# Patient Record
Sex: MALE | Race: WHITE | NOT HISPANIC OR LATINO | Employment: FULL TIME | URBAN - NONMETROPOLITAN AREA
[De-identification: names, ages, dates, MRNs, and addresses within clinical notes are randomized per-mention and may not be internally consistent; named-entity substitution may affect disease eponyms.]

---

## 2017-01-27 ENCOUNTER — HISTORY (OUTPATIENT)
Dept: FAMILY MEDICINE | Facility: OTHER | Age: 62
End: 2017-01-27

## 2017-01-27 ENCOUNTER — OFFICE VISIT - GICH (OUTPATIENT)
Dept: FAMILY MEDICINE | Facility: OTHER | Age: 62
End: 2017-01-27

## 2017-01-27 DIAGNOSIS — L03.012 CELLULITIS OF FINGER OF LEFT HAND: ICD-10-CM

## 2017-04-10 ENCOUNTER — HISTORY (OUTPATIENT)
Dept: FAMILY MEDICINE | Facility: OTHER | Age: 62
End: 2017-04-10

## 2017-04-10 ENCOUNTER — OFFICE VISIT - GICH (OUTPATIENT)
Dept: FAMILY MEDICINE | Facility: OTHER | Age: 62
End: 2017-04-10

## 2017-04-10 DIAGNOSIS — Z00.00 ENCOUNTER FOR GENERAL ADULT MEDICAL EXAMINATION WITHOUT ABNORMAL FINDINGS: ICD-10-CM

## 2017-04-10 DIAGNOSIS — I10 ESSENTIAL (PRIMARY) HYPERTENSION: ICD-10-CM

## 2017-04-10 DIAGNOSIS — E78.5 HYPERLIPIDEMIA: ICD-10-CM

## 2017-04-10 LAB
A/G RATIO - HISTORICAL: 1.2 (ref 1–2)
ALBUMIN SERPL-MCNC: 4.3 G/DL (ref 3.5–5.7)
ALP SERPL-CCNC: 50 IU/L (ref 34–104)
ALT (SGPT) - HISTORICAL: 22 IU/L (ref 7–52)
ANION GAP - HISTORICAL: 8 (ref 5–18)
AST SERPL-CCNC: 22 IU/L (ref 13–39)
BILIRUB SERPL-MCNC: 0.3 MG/DL (ref 0.3–1)
BUN SERPL-MCNC: 16 MG/DL (ref 7–25)
BUN/CREAT RATIO - HISTORICAL: 19
CALCIUM SERPL-MCNC: 9.3 MG/DL (ref 8.6–10.3)
CHLORIDE SERPLBLD-SCNC: 103 MMOL/L (ref 98–107)
CHOL/HDL RATIO - HISTORICAL: 3.67
CHOLESTEROL TOTAL: 158 MG/DL
CO2 SERPL-SCNC: 26 MMOL/L (ref 21–31)
CREAT SERPL-MCNC: 0.84 MG/DL (ref 0.7–1.3)
GFR IF NOT AFRICAN AMERICAN - HISTORICAL: >60 ML/MIN/1.73M2
GLOBULIN - HISTORICAL: 3.5 G/DL (ref 2–3.7)
GLUCOSE SERPL-MCNC: 102 MG/DL (ref 70–105)
HDLC SERPL-MCNC: 43 MG/DL (ref 23–92)
LDLC SERPL CALC-MCNC: 98 MG/DL
NON-HDL CHOLESTEROL - HISTORICAL: 115 MG/DL
PATIENT STATUS - HISTORICAL: NORMAL
POTASSIUM SERPL-SCNC: 4 MMOL/L (ref 3.5–5.1)
PROT SERPL-MCNC: 7.8 G/DL (ref 6.4–8.9)
PSA TOTAL (DIAGNOSTIC) - HISTORICAL: 2.45 NG/ML
SODIUM SERPL-SCNC: 137 MMOL/L (ref 133–143)
TRIGL SERPL-MCNC: 87 MG/DL

## 2018-01-03 NOTE — PROGRESS NOTES
Patient Information     Patient Name MRN Sex Danny Limon [id number] Male 1955      Progress Notes by Yovana Martin NP at 2017  4:45 PM     Author:  Yovana Martin NP Service:  (none) Author Type:  PHYS- Nurse Practitioner     Filed:  2017  6:19 PM Encounter Date:  2017 Status:  Signed     :  Yovana Martin NP (PHYS- Nurse Practitioner)        Procedure Orders:    1. INCISION AND DRAINAGE [[id number]] ordered by Yovana Martin NP at 17 1736            Post-procedure Diagnoses:    1. Paronychia of finger, left [L03.012]               Nursing Notes:   Radha Guajardo  2017  5:27 PM  Signed  Patient presents with swollen left index finger. Patient stated he split a nail a couple of weeks ago. Radha Guajardo LPN .............2017  4:55 PM    SUBJECTIVE:    Danny Lua is a 61 y.o. male who presents for finger infection    Abscess    This is a new problem. The current episode started less than one week ago (Swelling and pain for 3-4 days). The onset was sudden. The problem has been gradually worsening. The abscess is present on the left fingers. The problem is mild. The abscess is characterized by redness, painfulness and swelling. The patient was exposed to OTC medications. The abscess first occurred at home. Pertinent negatives include no fever, not sleeping more, no vomiting and no cough. His past medical history does not include skin abscesses in family. There were sick contacts at home. He has received no recent medical care.       Current Outpatient Prescriptions on File Prior to Visit       Medication  Sig Dispense Refill     lisinopril (PRINIVIL; ZESTRIL) 10 mg tablet Take 1 tablet by mouth once daily. 90 tablet 3     simvastatin (ZOCOR) 20 mg tablet Take 1 tablet by mouth at bedtime. 90 tablet 3     No current facility-administered medications on file prior to visit.        REVIEW OF SYSTEMS:  Review of Systems   Constitutional:  "Negative for fever.   Respiratory: Negative for cough.    Gastrointestinal: Negative for vomiting.       OBJECTIVE:  /78  Pulse 92  Temp 97.9  F (36.6  C) (Tympanic)  Ht 1.81 m (5' 11.26\")  Wt 90.6 kg (199 lb 12.8 oz)  BMI 27.66 kg/m2    EXAM:   Physical Exam   Constitutional: He is well-developed, well-nourished, and in no distress.   HENT:   Head: Normocephalic and atraumatic.   Eyes: Conjunctivae are normal.   Cardiovascular: Normal rate.    Pulmonary/Chest: Effort normal.   Musculoskeletal:   LT hand, Index finger Medial side has a paronychia: red, swollen, painful yellow fluid collection   Skin: Skin is warm and dry. No rash noted.   Nursing note and vitals reviewed.      INCISION AND DRAINAGE  Date/Time: 1/27/2017 5:25 PM  Performed by: ANTONY العراقي  Authorized by: ANTONY العراقي     Consent:     Consent obtained:  Verbal and written    Consent given by:  Patient    Risks discussed:  Bleeding, incomplete drainage and pain    Alternatives discussed:  No treatment, delayed treatment, alternative treatment, observation and referral  Location:     Type:  Abscess    Location:  Upper extremity    Upper extremity location:  Finger    Finger location:  L index finger  Pre-procedure details:     Skin preparation:  Hibiclens and Betadine  Anesthesia (see MAR for exact dosages):     Anesthesia method:  None  Procedure type:     Complexity:  Simple  Procedure details:     Incision types:  Stab incision    Incision depth:  Dermal    Scalpel blade:  11    Wound management:  Probed and deloculated    Drainage:  Purulent    Drainage amount:  Moderate    Wound treatment:  Wound left open    Packing materials:  None  Post-procedure details:     Patient tolerance of procedure:  Tolerated well, no immediate complications      ASSESSMENT/PLAN:    ICD-10-CM    1. Paronychia of finger, left L03.012 cephalexin (KEFLEX) 500 mg capsule      MS DRAIN SKIN ABSCESS SIMPLE        Plan:  Finger cleaned and abscess lanced. " Immediate return of pus. Soaks discussed, abx ointment discussed and oral abx discussed. I explained my diagnostic considerations and recommendations to the patient, who voiced understanding and agreement with the treatment plan. All questions were answered. We discussed potential side effects of any prescribed or recommended therapies, as well as expectations for response to treatments. He was advised to contact our office if there is no improvement or worsening of conditions or symptoms.  If s/s worsen or persist, patient will either come back or follow up with PCP.       ANTONY العراقي NP ....................  1/27/2017   6:19 PM

## 2018-01-03 NOTE — NURSING NOTE
Patient Information     Patient Name MRN Danny De Leon [id number] Male 1955      Nursing Note by Radha Guajardo at 2017  4:45 PM     Author:  Radha Guajardo Service:  (none) Author Type:  NURS- Student Practical Nurse     Filed:  2017  5:27 PM Encounter Date:  2017 Status:  Signed     :  Radha Guajardo (NURS- Student Practical Nurse)            Patient presents with swollen left index finger. Patient stated he split a nail a couple of weeks ago. Radha Guajardo LPN .............2017  4:55 PM

## 2018-01-03 NOTE — PATIENT INSTRUCTIONS
Patient Information     Patient Name MRN Danny De Leon [id number] Male 1955      Patient Instructions by Yovana Martin NP at 2017  4:45 PM     Author:  Yovana Martin NP Service:  (none) Author Type:  PHYS- Nurse Practitioner     Filed:  2017  5:29 PM Encounter Date:  2017 Status:  Signed     :  Yovana Martin NP (PHYS- Nurse Practitioner)            Cellulitis - Take the antibiotic as prescribed. Antibiotic has been sent to pharmacy. Please take full course of antibiotic even if symptoms have completely resolved. This helps prevent against antibiotic resistance.     Watch for any signs of increasing infection (redness, pus, increased pain, increased swelling or fever). Call if any of these signs occur. Monitor for fevers or chills.    You may draw line around area of redness to monitor area. Please return to clinic if redness is continuing to spread after 2-3 days.    Call or return to clinic as needed if your symptoms worsen or fail to improve as anticipated.    Soak the wound in hot water with antibacterial soap for 10-20  minutes at a time 4-5  times per day until healing is established.  Use antibiotic ointment on the wound 2 times daily.  You can keep it covered with a bandage if you think it will get dirty.

## 2018-01-04 NOTE — PROGRESS NOTES
Patient Information     Patient Name MRN Sex Danny Limon [id number] Male 1955      Progress Notes by Benny Overton MD at 4/10/2017  8:00 AM     Author:  Benny Overton MD Service:  (none) Author Type:  Physician     Filed:  4/10/2017  8:14 AM Encounter Date:  4/10/2017 Status:  Signed     :  Benny Overton MD (Physician)            SUBJECTIVE:  Danny Lua is a 61 y.o. male here for annual exam.  Patient has a history of hypertension and hyperlipidemia. These aren't stable with lisinopril and simvastatin. He tolerates his medications well. His dad  of prostate cancer but he has had no changes in urinary symptoms.  Last colonoscopy was in  and he scheduled repeat in . He has no other concerns today.      Patient Active Problem List      Diagnosis Date Noted     Diverticulosis of sigmoid colon 2015     H/O adenomatous polyp of colon 2015     Family history of prostate cancer 2014     Hyperlipidemia 2013     Tobacco abuse 2013     HYPERTENSION        No past medical history on file.    Past Surgical History:      Procedure  Laterality Date     COLONOSCOPY DIAGNOSTIC  4/20/15    F/U        COLONOSCOPY SCREENING  2011    few tics, large polyp in rectum (see path report).  Next due .         Current Outpatient Prescriptions       Medication  Sig Dispense Refill     lisinopril (PRINIVIL; ZESTRIL) 10 mg tablet Take 1 tablet by mouth once daily. 90 tablet 3     simvastatin (ZOCOR) 20 mg tablet Take 1 tablet by mouth at bedtime. 90 tablet 3     No current facility-administered medications for this visit.      Medications have been reviewed by me and are current to the best of my knowledge and ability.      Allergies:  No Known Allergies    Family History       Problem   Relation Age of Onset     Cancer  Mother      liver       Cancer-prostate  Father      metastatic       Heart Disease  Father      family history on father side    "      Social History       Substance Use Topics         Smoking status:   Current Every Day Smoker     Packs/day:  0.50     Types:  Cigarettes     Smokeless tobacco:   Never Used      Comment: cutting down      Alcohol use   No       ROS:    As above otherwise ROS is unremarkable.      OBJECTIVE:  /81  Pulse 97  Ht 1.791 m (5' 10.5\")  Wt 88.5 kg (195 lb 3.2 oz)  BMI 27.61 kg/m2    EXAM:  General Appearance: Pleasant, alert, appropriate appearance for age. No acute distress  Head: Normal. Normocephalic, atraumatic.  Eyes: PERRL, EOMI  Ears: Normal TM's bilaterally. Normal auditory canals and external ears.   OroPharynx: Dental hygiene adequate. Normal buccal mucosa. Normal pharynx.  Neck: Supple, no masses or nodes, no lymphadenopathy.  No thyromegaly.  Lungs: Normal chest wall and respirations. Clear to auscultation, no wheezes or crackles.  Cardiovascular: Regular rate and rhythm. S1, S2, no murmurs.  Gastrointestinal: Soft, nontender, no abnormal masses or organomegaly. BS normal.  : Prostate exam shows no nodules, tenderness. No hemorrhoids.  Musculoskeletal: No edema.  Skin: no concerning or new rashes.  Neurologic Exam: CN 2-12 grossly intact.  Normal gait.  Symmetric DTRs, No focal motor or sensory deficits. No tremor.  Psychiatric Exam: Alert and oriented, appropriate affect.    ASSESSEMENT AND PLAN:    Danny was seen today for physical.    Diagnoses and all orders for this visit:    Physical exam  -     PSA, TOTAL; Future  -     LIPID PANEL; Future  -     COMPLETE METABOLIC PANEL; Future    HYPERTENSION  -     lisinopril (PRINIVIL; ZESTRIL) 10 mg tablet; Take 1 tablet by mouth once daily.    Hyperlipidemia, unspecified hyperlipidemia type  -     simvastatin (ZOCOR) 20 mg tablet; Take 1 tablet by mouth at bedtime.    Colonoscopy in 2020. He is up-to-date on immunizations except for shingles vaccine which he'll pursue on his own. We'll get fasting labs today. His medications were refilled for " another year. Was again discussed importance of quitting smoking, he is not interested at this time but has cut down quite a bit over the last year.      Bismark Overton MD

## 2018-01-27 VITALS
HEIGHT: 71 IN | TEMPERATURE: 97.9 F | WEIGHT: 199.8 LBS | BODY MASS INDEX: 27.97 KG/M2 | HEART RATE: 92 BPM | DIASTOLIC BLOOD PRESSURE: 78 MMHG | SYSTOLIC BLOOD PRESSURE: 140 MMHG

## 2018-01-27 VITALS
HEIGHT: 71 IN | BODY MASS INDEX: 27.33 KG/M2 | HEART RATE: 97 BPM | DIASTOLIC BLOOD PRESSURE: 81 MMHG | SYSTOLIC BLOOD PRESSURE: 129 MMHG | WEIGHT: 195.2 LBS

## 2018-02-05 ENCOUNTER — DOCUMENTATION ONLY (OUTPATIENT)
Dept: FAMILY MEDICINE | Facility: OTHER | Age: 63
End: 2018-02-05

## 2018-02-05 PROBLEM — I10 HYPERTENSION: Status: ACTIVE | Noted: 2018-02-05

## 2018-02-05 RX ORDER — SIMVASTATIN 20 MG
20 TABLET ORAL AT BEDTIME
COMMUNITY
Start: 2017-04-10 | End: 2018-04-17

## 2018-02-05 RX ORDER — LISINOPRIL 10 MG/1
10 TABLET ORAL DAILY
COMMUNITY
Start: 2017-04-10 | End: 2018-04-17

## 2018-04-17 ENCOUNTER — OFFICE VISIT (OUTPATIENT)
Dept: FAMILY MEDICINE | Facility: OTHER | Age: 63
End: 2018-04-17
Attending: FAMILY MEDICINE
Payer: COMMERCIAL

## 2018-04-17 VITALS
HEART RATE: 80 BPM | DIASTOLIC BLOOD PRESSURE: 92 MMHG | SYSTOLIC BLOOD PRESSURE: 140 MMHG | BODY MASS INDEX: 29.18 KG/M2 | HEIGHT: 70 IN | WEIGHT: 203.8 LBS

## 2018-04-17 DIAGNOSIS — I10 ESSENTIAL HYPERTENSION: ICD-10-CM

## 2018-04-17 DIAGNOSIS — E78.00 PURE HYPERCHOLESTEROLEMIA: ICD-10-CM

## 2018-04-17 DIAGNOSIS — Z80.42 FAMILY HISTORY OF PROSTATE CANCER: ICD-10-CM

## 2018-04-17 DIAGNOSIS — Z00.00 ROUTINE HISTORY AND PHYSICAL EXAMINATION OF ADULT: Primary | ICD-10-CM

## 2018-04-17 DIAGNOSIS — Z72.0 TOBACCO ABUSE: ICD-10-CM

## 2018-04-17 LAB
ALBUMIN SERPL-MCNC: 4.5 G/DL (ref 3.5–5.7)
ALP SERPL-CCNC: 51 U/L (ref 34–104)
ALT SERPL W P-5'-P-CCNC: 24 U/L (ref 7–52)
ANION GAP SERPL CALCULATED.3IONS-SCNC: 8 MMOL/L (ref 3–14)
AST SERPL W P-5'-P-CCNC: 21 U/L (ref 13–39)
BILIRUB SERPL-MCNC: 0.4 MG/DL (ref 0.3–1)
BUN SERPL-MCNC: 16 MG/DL (ref 7–25)
CALCIUM SERPL-MCNC: 9.6 MG/DL (ref 8.6–10.3)
CHLORIDE SERPL-SCNC: 105 MMOL/L (ref 98–107)
CHOLEST SERPL-MCNC: 184 MG/DL
CO2 SERPL-SCNC: 26 MMOL/L (ref 21–31)
CREAT SERPL-MCNC: 0.88 MG/DL (ref 0.7–1.3)
GFR SERPL CREATININE-BSD FRML MDRD: 88 ML/MIN/1.7M2
GLUCOSE SERPL-MCNC: 102 MG/DL (ref 70–105)
HDLC SERPL-MCNC: 43 MG/DL (ref 23–92)
LDLC SERPL CALC-MCNC: 114 MG/DL
NONHDLC SERPL-MCNC: 141 MG/DL
POTASSIUM SERPL-SCNC: 4.2 MMOL/L (ref 3.5–5.1)
PROT SERPL-MCNC: 7.8 G/DL (ref 6.4–8.9)
PSA SERPL-MCNC: 2.98 NG/ML
SODIUM SERPL-SCNC: 139 MMOL/L (ref 134–144)
TRIGL SERPL-MCNC: 134 MG/DL

## 2018-04-17 PROCEDURE — 84153 ASSAY OF PSA TOTAL: CPT | Performed by: FAMILY MEDICINE

## 2018-04-17 PROCEDURE — 36415 COLL VENOUS BLD VENIPUNCTURE: CPT | Performed by: FAMILY MEDICINE

## 2018-04-17 PROCEDURE — 80061 LIPID PANEL: CPT | Performed by: FAMILY MEDICINE

## 2018-04-17 PROCEDURE — 99396 PREV VISIT EST AGE 40-64: CPT | Performed by: FAMILY MEDICINE

## 2018-04-17 PROCEDURE — 80053 COMPREHEN METABOLIC PANEL: CPT | Performed by: FAMILY MEDICINE

## 2018-04-17 RX ORDER — LISINOPRIL 10 MG/1
10 TABLET ORAL DAILY
Qty: 90 TABLET | Refills: 3 | Status: SHIPPED | OUTPATIENT
Start: 2018-04-17 | End: 2019-04-29

## 2018-04-17 RX ORDER — SIMVASTATIN 20 MG
20 TABLET ORAL AT BEDTIME
Qty: 90 TABLET | Refills: 3 | Status: SHIPPED | OUTPATIENT
Start: 2018-04-17 | End: 2019-04-29

## 2018-04-17 NOTE — NURSING NOTE
Patient presents today for his annual physical.  Elo Nielson LPN .............4/17/2018  8:46 AM

## 2018-04-17 NOTE — MR AVS SNAPSHOT
"              After Visit Summary   2018    Danny Lua    MRN: [id number]           Patient Information     Date Of Birth          1955        Visit Information        Provider Department      2018 8:45 AM Benny Overton MD [facility]        Today's Diagnoses     Routine history and physical examination of adult    -  1    Pure hypercholesterolemia        Essential hypertension        Family history of prostate cancer        Tobacco abuse           Follow-ups after your visit        Who to contact     If you have questions or need follow up information about today's clinic visit or your schedule please contact [facility] directly at [phone].  Normal or non-critical lab and imaging results will be communicated to you by [employer], letter or phone within 4 business days after the clinic has received the results. If you do not hear from us within 7 days, please contact the clinic through [employer] or phone. If you have a critical or abnormal lab result, we will notify you by phone as soon as possible.  Submit refill requests through [employer] or call your pharmacy and they will forward the refill request to us. Please allow 3 business days for your refill to be completed.          Additional Information About Your Visit        MyChart Information     [employer] lets you send messages to your doctor, view your test results, renew your prescriptions, schedule appointments and more. To sign up, go to [email or web address] . Click on \"Log in\" on the left side of the screen, which will take you to the Welcome page. Then click on \"Sign up Now\" on the right side of the page.     You will be asked to enter the access code listed below, as well as some personal information. Please follow the directions to create your username and password.     Your access code is: [email or web address]  Expires: 2018  9:20 AM     Your access code will  in 90 days. If you need help " "or a new code, please call your [place] clinic or [phone].        Care EveryWhere ID     This is your Care EveryWhere ID. This could be used by other organizations to access your [place] medical records  [id number]        Your Vitals Were     Pulse Height BMI (Body Mass Index)             80 5' 10\" (1.778 m) 29.24 kg/m2          Blood Pressure from Last 3 Encounters:   04/17/18 (!) 140/92   04/10/17 129/81   01/27/17 140/78    Weight from Last 3 Encounters:   04/17/18 203 lb 12.8 oz (92.4 kg)   04/10/17 195 lb 3.2 oz (88.5 kg)   01/27/17 199 lb 12.8 oz (90.6 kg)              We Performed the Following     Comprehensive metabolic panel     Lipid Profile     PSA GH          Where to get your medicines      These medications were sent to [facility] [address] [place] [id number]     Phone:  [phone]     lisinopril 10 MG tablet    simvastatin 20 MG tablet          Primary Care Provider Office Phone # Fax #    Benny Overton [phone]       [address] [facility] [address]        Equal Access to Services     YOKASTA DUNAWAY AH: Hadii dejon ku hadasho Soomaali, waaxda luqadaha, qaybta kaalmada adeegyada, waxay idiin haycarsonn merlene momin. So [facility] [phone].    ATENCIÓN: Si habla español, tiene a gooden disposición servicios gratZia Health Clinicos de asistencia lingüística. Llshruthi al [phone].    We comply with applicable federal civil rights laws and [place] laws. We do not discriminate on the basis of race, color, national origin, age, disability, sex, sexual orientation, or gender identity.            Thank you!     Thank you for choosing [facility] AND [facility]  for your care. Our goal is always to provide you with excellent care. Hearing back from our patients is one way we can continue to improve our services. Please take a few minutes to complete the written survey that you may receive in the mail " after your visit with us. Thank you!             Your Updated Medication List - Protect others around you: Learn how to safely use, store and throw away your medicines at www.disposemymeds.org.          This list is accurate as of 4/17/18  9:20 AM.  Always use your most recent med list.                   Brand Name Dispense Instructions for use Diagnosis    lisinopril 10 MG tablet    PRINIVIL/ZESTRIL    90 tablet    Take 1 tablet (10 mg) by mouth daily    Essential hypertension       simvastatin 20 MG tablet    ZOCOR    90 tablet    Take 1 tablet (20 mg) by mouth At Bedtime    Pure hypercholesterolemia

## 2018-04-17 NOTE — PROGRESS NOTES
SUBJECTIVE:  Danny Lua is a 62 year old male here for follow-up.  He has a history of hypertension and hyperlipidemia.  He is due for fasting labs and medication refills.  He has no new concerns today.  He continues to smoke half a pack a day and is not ready to quit smoking.      Patient Active Problem List    Diagnosis Date Noted     Hypertension 02/05/2018     Priority: Medium     Diverticulosis of sigmoid colon 04/20/2015     Priority: Medium     H/O adenomatous polyp of colon 04/16/2015     Priority: Medium     Family history of prostate cancer 02/18/2014     Priority: Medium     Hyperlipidemia 11/18/2013     Priority: Medium     Tobacco abuse 11/18/2013     Priority: Medium       History reviewed. No pertinent past medical history.    Past Surgical History:   Procedure Laterality Date     COLONOSCOPY      01/20/2011,few tics, large polyp in rectum (see path report).  Next due 2014.     COLONOSCOPY      4/20/15,F/U 2020       Current Outpatient Prescriptions   Medication Sig Dispense Refill     lisinopril (PRINIVIL/ZESTRIL) 10 MG tablet Take 1 tablet (10 mg) by mouth daily 90 tablet 3     simvastatin (ZOCOR) 20 MG tablet Take 1 tablet (20 mg) by mouth At Bedtime 90 tablet 3     [DISCONTINUED] lisinopril (PRINIVIL/ZESTRIL) 10 MG tablet Take 10 mg by mouth daily       [DISCONTINUED] simvastatin (ZOCOR) 20 MG tablet Take 20 mg by mouth At Bedtime         Allergies:  No Known Allergies    Family History   Problem Relation Age of Onset     CANCER Mother      Cancer,liver     Prostate Cancer Father      Cancer-prostate,metastatic     HEART DISEASE Father      Heart Disease,family history on father side       Social History   Substance Use Topics     Smoking status: Current Every Day Smoker     Packs/day: 0.50     Types: Cigarettes     Smokeless tobacco: Never Used      Comment: Quit smoking: cutting down     Alcohol use No       ROS:    As above otherwise ROS is unremarkable.      OBJECTIVE:  BP (!) 140/92 (BP  "Location: Right arm, Patient Position: Sitting, Cuff Size: Adult Regular)  Pulse 80  Ht 5' 10\" (1.778 m)  Wt 203 lb 12.8 oz (92.4 kg)  BMI 29.24 kg/m2    EXAM:  General Appearance: Pleasant, alert, appropriate appearance for age. No acute distress  Head: Normal. Normocephalic, atraumatic.  Eyes: PERRL, EOMI  Ears: Normal TM's bilaterally. Normal auditory canals and external ears.   OroPharynx: Dental hygiene adequate. Normal buccal mucosa. Normal pharynx.  Neck: Supple, no masses or nodes, no lymphadenopathy.  No thyromegaly.  Lungs: Normal chest wall and respirations. Clear to auscultation, no wheezes or crackles.  Cardiovascular: Regular rate and rhythm. S1, S2, no murmurs.  Gastrointestinal: Soft, nontender, no abnormal masses or organomegaly. BS normal.  : Small external hemorrhage are noted.  Prostate exam shows no nodules, tenderness or asymmetry.  Musculoskeletal: No edema.  Skin: no concerning or new rashes.  Neurologic Exam: CN 2-12 grossly intact.  Normal gait.  Symmetric DTRs, No focal motor or sensory deficits. No tremor.  Psychiatric Exam: Alert and oriented, appropriate affect.    ASSESSEMENT AND PLAN:    1. Routine history and physical examination of adult    2. Pure hypercholesterolemia    3. Essential hypertension    4. Family history of prostate cancer    5. Tobacco abuse      Discussed shingles vaccine which she will pursue on his own.  He is otherwise up-to-date on his tetanus.  Colonoscopy in 2020.    Fasting labs are performed today.    Blood pressure is adequate.  Encouraged daily exercise and low-salt diet.    Discussed importance of quitting smoking, he is not interested at this time.  He did not tolerate Chantix well.  We briefly discussed his other options such as nicotine replacement options and Wellbutrin again he declined at this time.  A total of 5 minutes were spent discussing his options.    Bismark Overton MD  Family Medicine      This document was prepared using voice generated " software.  While every attempt was made for accuracy, grammatical errors may exist.

## 2018-04-17 NOTE — LETTER
April 17, 2018      Danny Lua    [address] [facility] [place] [address]        Dear ,    We are writing to inform you of your test results.    Your cholesterol panel, diabetes screening with fasting glucose, liver, kidney and prostate testing all came back normal.  This is all reassuring and should be rechecked again in 1 year.    Resulted Orders   Lipid Profile   Result Value Ref Range    Cholesterol 184 <200 mg/dL    Triglycerides 134 <150 mg/dL    HDL Cholesterol 43 23 - 92 mg/dL    LDL Cholesterol Calculated 114 (H) <100 mg/dL      Comment:      Above desirable:  100-129 mg/dl  Borderline High:  130-159 mg/dL  High:             160-189 mg/dL  Very high:       >189 mg/dl      Non HDL Cholesterol 141 (H) <130 mg/dL      Comment:      Above Desirable:  130-159 mg/dl  Borderline high:  160-189 mg/dl  High:             190-219 mg/dl  Very high:       >219 mg/dl     Comprehensive metabolic panel   Result Value Ref Range    Sodium 139 134 - 144 mmol/L    Potassium 4.2 3.5 - 5.1 mmol/L    Chloride 105 98 - 107 mmol/L    Carbon Dioxide 26 21 - 31 mmol/L    Anion Gap 8 3 - 14 mmol/L    Glucose 102 70 - 105 mg/dL    Urea Nitrogen 16 7 - 25 mg/dL    Creatinine 0.88 0.70 - 1.30 mg/dL    GFR Estimate 88 >60 mL/min/1.7m2    GFR Estimate If Black >90 >60 mL/min/1.7m2    Calcium 9.6 8.6 - 10.3 mg/dL    Bilirubin Total 0.4 0.3 - 1.0 mg/dL    Albumin 4.5 3.5 - 5.7 g/dL    Protein Total 7.8 6.4 - 8.9 g/dL    Alkaline Phosphatase 51 34 - 104 U/L    ALT 24 7 - 52 U/L    AST 21 13 - 39 U/L   PSA GH   Result Value Ref Range    Prostate Specific Antigen 2.978 <3.100 ng/mL       If you have any questions or concerns, please call the clinic at the number listed above.       Sincerely,        Benny Overton MD

## 2018-07-23 NOTE — PROGRESS NOTES
Patient Information     Patient Name  Danny Lua MRN  [id number] Sex  Male   1955      Letter by Benny Overton MD at      Author:  Benny Overton MD Service:  (none) Author Type:  (none)    Filed:   Encounter Date:  4/10/2017 Status:  (Other)           Danny Lua  [address]  [address] [facility] [address]          April 10, 2017    Dear Mr. Lua:    Your recent lab values can be seen below.     Your cholesterol panel, diabetes screening with fasting glucose, liver, kidney and prostate testing came back normal. This is reassuring and should be checked again in one year.    If you have any questions, do not hesitate to contact me.    Results for orders placed or performed in visit on 04/10/17      PSA, TOTAL      Result  Value Ref Range    PSA TOTAL (DIAGNOSTIC) 2.454 <=3.100 ng/mL   LIPID PANEL      Result  Value Ref Range    CHOLESTEROL,TOTAL 158 <200 mg/dL    TRIGLYCERIDES 87 <150 mg/dL    HDL CHOLESTEROL 43 23 - 92 mg/dL    NON-HDL CHOLESTEROL 115 <145 mg/dl    CHOL/HDL RATIO            3.67 <4.50                    LDL CHOLESTEROL 98 <100 mg/dL    PATIENT STATUS            FASTING                   COMPLETE METABOLIC PANEL      Result  Value Ref Range    SODIUM 137 133 - 143 mmol/L    POTASSIUM 4.0 3.5 - 5.1 mmol/L    CHLORIDE 103 98 - 107 mmol/L    CO2,TOTAL 26 21 - 31 mmol/L    ANION GAP 8 5 - 18                    GLUCOSE 102 70 - 105 mg/dL    CALCIUM 9.3 8.6 - 10.3 mg/dL    BUN 16 7 - 25 mg/dL    CREATININE 0.84 0.70 - 1.30 mg/dL    BUN/CREAT RATIO           19                    GFR if African American >60 >60 ml/min/1.73m2    GFR if not African American >60 >60 ml/min/1.73m2    ALBUMIN 4.3 3.5 - 5.7 g/dL    PROTEIN,TOTAL 7.8 6.4 - 8.9 g/dL    GLOBULIN                  3.5 2.0 - 3.7 g/dL    A/G RATIO 1.2 1.0 - 2.0                    BILIRUBIN,TOTAL 0.3 0.3 - 1.0 mg/dL    ALK PHOSPHATASE 50 34 - 104 IU/L    ALT (SGPT) 22 7 - 52 IU/L    AST (SGOT) 22 13 - 39 IU/L          Sincerely,        Bismark Overton MD  Family Medicine

## 2019-04-16 ENCOUNTER — OFFICE VISIT (OUTPATIENT)
Dept: FAMILY MEDICINE | Facility: OTHER | Age: 64
End: 2019-04-16
Attending: FAMILY MEDICINE
Payer: COMMERCIAL

## 2019-04-16 VITALS
TEMPERATURE: 98.4 F | SYSTOLIC BLOOD PRESSURE: 152 MMHG | WEIGHT: 205.2 LBS | HEART RATE: 100 BPM | HEIGHT: 70 IN | BODY MASS INDEX: 29.38 KG/M2 | DIASTOLIC BLOOD PRESSURE: 80 MMHG | RESPIRATION RATE: 14 BRPM

## 2019-04-16 DIAGNOSIS — M99.01 SOMATIC DYSFUNCTION OF SPINE, CERVICAL: Primary | ICD-10-CM

## 2019-04-16 PROCEDURE — 99213 OFFICE O/P EST LOW 20 MIN: CPT | Performed by: FAMILY MEDICINE

## 2019-04-16 ASSESSMENT — PATIENT HEALTH QUESTIONNAIRE - PHQ9: SUM OF ALL RESPONSES TO PHQ QUESTIONS 1-9: 0

## 2019-04-16 ASSESSMENT — PAIN SCALES - GENERAL: PAINLEVEL: MILD PAIN (3)

## 2019-04-16 ASSESSMENT — MIFFLIN-ST. JEOR: SCORE: 1737.03

## 2019-04-16 NOTE — PROGRESS NOTES
Nursing Notes:   Berta Busch LPN  4/16/2019  2:19 PM  Sign at exiting of workspace  Patient presents to clinic with neck pain. He states that the pain started about 2-3 weeks ago and no known injury. He says its like a sharp shooting pain when turning head to left, or looking down.  Berta Taylor ....................  4/16/2019   2:19 PM      SUBJECTIVE:   Danny Lua is a 63 year old male who presents to clinic today for the following health issues:     Here with pain in neck that started 2-3 weeks ago, awoke with the pain and no event or activity that initiated the pain. Usually sleeps on his sides. No headaches but hurts especially turning to left and when he looks down at phone or IPAD and then up he has a sharp discomfort in the neck only.  This does not radiate into his extremities and he is having no arm or extremity pain.  No history of trauma to the neck.  Last night when he got home from work it was bothering him more so he put some ice on it and it was slightly better when he awoke this morning.    HPI    Patient Active Problem List   Diagnosis     Diverticulosis of sigmoid colon     Family history of prostate cancer     H/O adenomatous polyp of colon     Hyperlipidemia     Hypertension     Tobacco abuse     Past Surgical History:   Procedure Laterality Date     COLONOSCOPY      01/20/2011,few tics, large polyp in rectum (see path report).  Next due 2014.     COLONOSCOPY      4/20/15,F/U 2020       Social History     Tobacco Use     Smoking status: Current Every Day Smoker     Packs/day: 0.50     Types: Cigarettes     Smokeless tobacco: Never Used     Tobacco comment: Quit smoking: cutting down   Substance Use Topics     Alcohol use: No     Family History   Problem Relation Age of Onset     Cancer Mother         Cancer,liver     Prostate Cancer Father         Cancer-prostate,metastatic     Heart Disease Father         Heart Disease,family history on father side         Current  "Outpatient Medications   Medication Sig Dispense Refill     lisinopril (PRINIVIL/ZESTRIL) 10 MG tablet Take 1 tablet (10 mg) by mouth daily 90 tablet 3     simvastatin (ZOCOR) 20 MG tablet Take 1 tablet (20 mg) by mouth At Bedtime 90 tablet 3     No Known Allergies    Review of Systems     OBJECTIVE:     /80   Pulse 100   Temp 98.4  F (36.9  C)   Resp 14   Ht 1.778 m (5' 10\")   Wt 93.1 kg (205 lb 3.2 oz)   BMI 29.44 kg/m    Body mass index is 29.44 kg/m .  Physical Exam   Constitutional: He appears well-developed. No distress.   Musculoskeletal:   No crepitus in neck but decreased range of motion with rotation to the left and slightly with forward flexion.  No significant tightness of the paraspinal muscles.   Neurological: He displays normal reflexes.   Psychiatric: He has a normal mood and affect. His behavior is normal.   Nursing note and vitals reviewed.      Diagnostic Test Results:  none     ASSESSMENT/PLAN:           ICD-10-CM    1. Somatic dysfunction of spine, cervical M99.01        Plan:  Discussed pathophysiology and management of this type of cervical finding.  He does not seem to have a lot of muscle spasming and likely has facet dysfunction.  Chiropractic intervention encouraged.  Continue OTCs as needed and if not improving when he comes back for his physical at the end of April and consider imaging.    Kate Nielson MD  [facility]    Portions of this dictation were created using the Dragon Nuance voice recognition system. Proofreading was completed but there may be errors in text.    "

## 2019-04-16 NOTE — NURSING NOTE
Patient presents to clinic with neck pain. He states that the pain started about 2-3 weeks ago and no known injury. He says its like a sharp shooting pain when turning head to left, or looking down.  Berta Taylor ....................  4/16/2019   2:19 PM

## 2019-04-16 NOTE — PATIENT INSTRUCTIONS
Patient Education     Neck Sprain or Strain  A sudden force that causes turning or bending of the neck can cause sprain or strain. An example would be the force from a car accident. This can stretch or tear muscles called a strain. It can also stretch or tear ligaments called a sprain. Either of these can cause neck pain. Sometimes neck pain occurs after a simple awkward movement. In either case, muscle spasm is commonly present and contributes to the pain.   Unless you had a forceful physical injury (for example, a car accident or fall), X-rays are often not ordered for the initial evaluation of neck pain. If pain continues and does not respond to medical treatment, X-rays and other tests may be done later.  Home care    You may feel more soreness and spasm the first few days after the injury. Rest until symptoms start to improve.    When lying down, use a comfortable pillow or a rolled towel that supports the head and keeps the spine in a neutral position. The position of the head should not be tilted forward or backward.    Apply an ice pack over the injured area for 15 to 20 minutes every 3 to 6 hours. Do this for the first 24 to 48 hours. You can make an ice pack by filling a plastic bag that seals at the top with ice cubes and then wrapping it with a thin towel. After 48 hours, apply heat (warm shower or warm bath) for 15 to 20 minutes several times a day, or alternate ice and heat.    You may use over-the-counter pain medicine to control pain, unless another pain medicine was prescribed. If you have chronic liver or kidney disease or ever had a stomach ulcer or gastrointestinal bleeding, talk with your healthcare provider before using these medicines.    If a soft cervical collar was prescribed, only ear it for periods of increased pain. It should not be worn for more than 3 hours a day, or for longer than 1 to 2 weeks.  Follow-up care  Follow up with your healthcare provider, or as directed. Physical  therapy may be needed.  Sometimes fractures don t show up on the first X-ray. Bruises and sprains can sometimes hurt as much as a fracture. These injuries can take time to heal completely. If your symptoms don t improve or they get worse, talk with your healthcare provider. You may need a repeat X-ray or other tests. If X-rays were taken, you will be told of any new findings that may affect your care.  Call 911  Call 911 if you have:    Neck swelling, difficulty or painful swallowing    Trouble breathing    Chest pain   When to seek medical advice  Call your healthcare provider right away if any of these occur:    Pain becomes worse or spreads into your arms or legs    Weakness or numbness in one or both arms or legs  Date Last Reviewed: 5/1/2018 2000-2018 [employer]. [address] [place] [address], [place] [address]. All rights reserved. This information is not intended as a substitute for professional medical care. Always follow your healthcare professional's instructions.

## 2019-04-29 ENCOUNTER — OFFICE VISIT (OUTPATIENT)
Dept: FAMILY MEDICINE | Facility: OTHER | Age: 64
End: 2019-04-29
Attending: FAMILY MEDICINE
Payer: COMMERCIAL

## 2019-04-29 VITALS
DIASTOLIC BLOOD PRESSURE: 88 MMHG | TEMPERATURE: 97.6 F | HEIGHT: 70 IN | WEIGHT: 202.8 LBS | SYSTOLIC BLOOD PRESSURE: 128 MMHG | BODY MASS INDEX: 29.03 KG/M2 | RESPIRATION RATE: 16 BRPM | HEART RATE: 96 BPM

## 2019-04-29 DIAGNOSIS — Z00.00 ROUTINE HISTORY AND PHYSICAL EXAMINATION OF ADULT: Primary | ICD-10-CM

## 2019-04-29 DIAGNOSIS — Z11.59 NEED FOR HEPATITIS C SCREENING TEST: ICD-10-CM

## 2019-04-29 DIAGNOSIS — E78.00 PURE HYPERCHOLESTEROLEMIA: ICD-10-CM

## 2019-04-29 DIAGNOSIS — Z12.5 SCREENING FOR PROSTATE CANCER: ICD-10-CM

## 2019-04-29 DIAGNOSIS — I10 ESSENTIAL HYPERTENSION: ICD-10-CM

## 2019-04-29 LAB
ALBUMIN SERPL-MCNC: 4.6 G/DL (ref 3.5–5.7)
ALP SERPL-CCNC: 58 U/L (ref 34–104)
ALT SERPL W P-5'-P-CCNC: 21 U/L (ref 7–52)
ANION GAP SERPL CALCULATED.3IONS-SCNC: 8 MMOL/L (ref 3–14)
AST SERPL W P-5'-P-CCNC: 19 U/L (ref 13–39)
BILIRUB SERPL-MCNC: 0.4 MG/DL (ref 0.3–1)
BUN SERPL-MCNC: 11 MG/DL (ref 7–25)
CALCIUM SERPL-MCNC: 9.6 MG/DL (ref 8.6–10.3)
CHLORIDE SERPL-SCNC: 103 MMOL/L (ref 98–107)
CHOLEST SERPL-MCNC: 188 MG/DL
CO2 SERPL-SCNC: 29 MMOL/L (ref 21–31)
CREAT SERPL-MCNC: 0.92 MG/DL (ref 0.7–1.3)
GFR SERPL CREATININE-BSD FRML MDRD: 83 ML/MIN/{1.73_M2}
GLUCOSE SERPL-MCNC: 101 MG/DL (ref 70–105)
HDLC SERPL-MCNC: 44 MG/DL (ref 23–92)
LDLC SERPL CALC-MCNC: 107 MG/DL
NONHDLC SERPL-MCNC: 144 MG/DL
POTASSIUM SERPL-SCNC: 4.1 MMOL/L (ref 3.5–5.1)
PROT SERPL-MCNC: 7.6 G/DL (ref 6.4–8.9)
PSA SERPL-ACNC: 3.03 NG/ML
SODIUM SERPL-SCNC: 140 MMOL/L (ref 134–144)
TRIGL SERPL-MCNC: 183 MG/DL

## 2019-04-29 PROCEDURE — 99396 PREV VISIT EST AGE 40-64: CPT | Performed by: FAMILY MEDICINE

## 2019-04-29 PROCEDURE — 36415 COLL VENOUS BLD VENIPUNCTURE: CPT | Mod: ZL | Performed by: FAMILY MEDICINE

## 2019-04-29 PROCEDURE — 86803 HEPATITIS C AB TEST: CPT | Mod: ZL | Performed by: FAMILY MEDICINE

## 2019-04-29 PROCEDURE — 80053 COMPREHEN METABOLIC PANEL: CPT | Mod: ZL | Performed by: FAMILY MEDICINE

## 2019-04-29 PROCEDURE — G0103 PSA SCREENING: HCPCS | Mod: ZL | Performed by: FAMILY MEDICINE

## 2019-04-29 PROCEDURE — 80061 LIPID PANEL: CPT | Mod: ZL | Performed by: FAMILY MEDICINE

## 2019-04-29 RX ORDER — LISINOPRIL 10 MG/1
10 TABLET ORAL DAILY
Qty: 90 TABLET | Refills: 3 | Status: SHIPPED | OUTPATIENT
Start: 2019-04-29 | End: 2020-03-23

## 2019-04-29 RX ORDER — SIMVASTATIN 20 MG
20 TABLET ORAL AT BEDTIME
Qty: 90 TABLET | Refills: 3 | Status: SHIPPED | OUTPATIENT
Start: 2019-04-29 | End: 2020-03-23

## 2019-04-29 ASSESSMENT — MIFFLIN-ST. JEOR: SCORE: 1726.14

## 2019-04-29 ASSESSMENT — PAIN SCALES - GENERAL: PAINLEVEL: NO PAIN (0)

## 2019-04-29 ASSESSMENT — PATIENT HEALTH QUESTIONNAIRE - PHQ9: SUM OF ALL RESPONSES TO PHQ QUESTIONS 1-9: 0

## 2019-04-29 NOTE — LETTER
April 30, 2019      Danny Lua  [address]    [facility] [address]        Dear ,    We are writing to inform you of your test results.    Your cholesterol panel, diabetes screening fasting glucose, liver, kidney and prostate testing all came back normal.  This should all be repeated again in 1 year.    Your hepatitis C testing came back negative which is reassuring.    Resulted Orders   Hepatitis C Screen Reflex to HCV RNA Quant and Genotype   Result Value Ref Range    Hepatitis C Antibody Nonreactive NR^Nonreactive      Comment:      Assay performance characteristics have not been established for newborns,   infants, and children     PSA Screen GH   Result Value Ref Range    PSA Screen 3.029 <3.100 ng/mL   Comprehensive metabolic panel   Result Value Ref Range    Sodium 140 134 - 144 mmol/L    Potassium 4.1 3.5 - 5.1 mmol/L    Chloride 103 98 - 107 mmol/L    Carbon Dioxide 29 21 - 31 mmol/L    Anion Gap 8 3 - 14 mmol/L    Glucose 101 70 - 105 mg/dL    Urea Nitrogen 11 7 - 25 mg/dL    Creatinine 0.92 0.70 - 1.30 mg/dL    GFR Estimate 83 >60 mL/min/[1.73_m2]    GFR Estimate If Black >90 >60 mL/min/[1.73_m2]    Calcium 9.6 8.6 - 10.3 mg/dL    Bilirubin Total 0.4 0.3 - 1.0 mg/dL    Albumin 4.6 3.5 - 5.7 g/dL    Protein Total 7.6 6.4 - 8.9 g/dL    Alkaline Phosphatase 58 34 - 104 U/L    ALT 21 7 - 52 U/L    AST 19 13 - 39 U/L   Lipid Profile   Result Value Ref Range    Cholesterol 188 <200 mg/dL    Triglycerides 183 (H) <150 mg/dL      Comment:      Borderline high:  150-199 mg/dl  High:             200-499 mg/dl  Very high:       >499 mg/dl      HDL Cholesterol 44 23 - 92 mg/dL    LDL Cholesterol Calculated 107 (H) <100 mg/dL      Comment:      Above desirable:  100-129 mg/dl  Borderline High:  130-159 mg/dL  High:             160-189 mg/dL  Very high:       >189 mg/dl      Non HDL Cholesterol 144 (H) <130 mg/dL      Comment:      Above Desirable:  130-159 mg/dl  Borderline high:  160-189  mg/dl  High:             190-219 mg/dl  Very high:       >219 mg/dl         If you have any questions or concerns, please call the clinic at the number listed above.       Sincerely,        Benny Overton MD

## 2019-04-29 NOTE — PROGRESS NOTES
SUBJECTIVE:  Danny Lua is a 63 year old male here for annual exam.  He has a history of hypertension hyperlipidemia.  These have both been well controlled and he is tolerating his medications well.    He continues to smoke but has nicotine patches that he is planning on starting in the next 1 to 2 weeks.      Patient Active Problem List    Diagnosis Date Noted     Hypertension 02/05/2018     Priority: Medium     Diverticulosis of sigmoid colon 04/20/2015     Priority: Medium     H/O adenomatous polyp of colon 04/16/2015     Priority: Medium     Family history of prostate cancer 02/18/2014     Priority: Medium     Hyperlipidemia 11/18/2013     Priority: Medium     Tobacco abuse 11/18/2013     Priority: Medium       History reviewed. No pertinent past medical history.    Past Surgical History:   Procedure Laterality Date     COLONOSCOPY      01/20/2011,few tics, large polyp in rectum (see path report).  Next due 2014.     COLONOSCOPY      4/20/15,F/U 2020       Current Outpatient Medications   Medication Sig Dispense Refill     lisinopril (PRINIVIL/ZESTRIL) 10 MG tablet Take 1 tablet (10 mg) by mouth daily 90 tablet 3     simvastatin (ZOCOR) 20 MG tablet Take 1 tablet (20 mg) by mouth At Bedtime 90 tablet 3       Allergies:  No Known Allergies    Family History   Problem Relation Age of Onset     Cancer Mother         Cancer,liver     Prostate Cancer Father         Cancer-prostate,metastatic     Heart Disease Father         Heart Disease,family history on father side       Social History     Tobacco Use     Smoking status: Current Every Day Smoker     Packs/day: 0.50     Types: Cigarettes     Smokeless tobacco: Never Used     Tobacco comment: Quit smoking: cutting down   Substance Use Topics     Alcohol use: No     Drug use: Never     Comment: Drug use: No       ROS:    As above otherwise ROS is unremarkable.      OBJECTIVE:  /88   Pulse 96   Temp 97.6  F (36.4  C) (Tympanic)   Resp 16   Ht 1.778 m  "(5' 10\")   Wt 92 kg (202 lb 12.8 oz)   BMI 29.10 kg/m      EXAM:  General Appearance: Pleasant, alert, appropriate appearance for age. No acute distress  Head: Normal. Normocephalic, atraumatic.  Eyes: PERRL, EOMI  Ears: Normal TM's bilaterally. Normal auditory canals and external ears.   OroPharynx: Dental hygiene adequate. Normal buccal mucosa. Normal pharynx.  Neck: Supple, no masses or nodes, no lymphadenopathy.  No thyromegaly.  Lungs: Normal chest wall and respirations. Clear to auscultation, no wheezes or crackles.  Cardiovascular: Regular rate and rhythm. S1, S2, no murmurs.  Gastrointestinal: Soft, nontender, no abnormal masses or organomegaly. BS normal.  : Symmetrically enlarged prostate without any nodules or tenderness.  No hemorrhoids.  Musculoskeletal: No edema.  Skin: no concerning or new rashes.  Neurologic Exam: CN 2-12 grossly intact.  Normal gait.  Symmetric DTRs, No focal motor or sensory deficits. No tremor.  Psychiatric Exam: Alert and oriented, appropriate affect.    ASSESSEMENT AND PLAN:    1. Routine history and physical examination of adult    2. Essential hypertension    3. Pure hypercholesterolemia    4. Screening for prostate cancer    5. Need for hepatitis C screening test      Will be due for colonoscopy in 2020.  Up-to-date on immunizations.    Will get fasting labs.    Medications refilled for another year.    Encouraged him to quit smoking and he will contact me if he needs a new prescription for nicotine patches down the road.    Bismark Overton MD  Family Medicine      This document was prepared using voice generated software.  While every attempt was made for accuracy, grammatical errors may exist.  "

## 2019-04-29 NOTE — NURSING NOTE
Patient presents today for annual physical.  Medication Reconciliation Complete    Mariola Ladd LPN  4/29/2019 10:33 AM

## 2019-04-30 LAB — HCV AB SERPL QL IA: NONREACTIVE

## 2020-03-23 ENCOUNTER — TELEPHONE (OUTPATIENT)
Dept: FAMILY MEDICINE | Facility: OTHER | Age: 65
End: 2020-03-23

## 2020-03-23 DIAGNOSIS — E78.00 PURE HYPERCHOLESTEROLEMIA: ICD-10-CM

## 2020-03-23 DIAGNOSIS — I10 ESSENTIAL HYPERTENSION: ICD-10-CM

## 2020-03-23 RX ORDER — LISINOPRIL 10 MG/1
10 TABLET ORAL DAILY
Qty: 90 TABLET | Refills: 3 | Status: SHIPPED | OUTPATIENT
Start: 2020-03-23 | End: 2021-04-05

## 2020-03-23 RX ORDER — SIMVASTATIN 20 MG
20 TABLET ORAL AT BEDTIME
Qty: 90 TABLET | Refills: 3 | Status: SHIPPED | OUTPATIENT
Start: 2020-03-23 | End: 2021-04-05

## 2020-03-23 NOTE — TELEPHONE ENCOUNTER
Patient just needs his medications filled. Refill pended. He will follow up this summer.    Mariola Ladd LPN on 3/23/2020 at 2:52 PM

## 2020-03-23 NOTE — TELEPHONE ENCOUNTER
Left message for patient to call back. He is scheduled with Dr. Overton Friday for medication check. If he just needs his lisinopril and simvastatin filled, we can send that in.     Mariola Ladd LPN on 3/23/2020 at 2:48 PM

## 2021-04-05 ENCOUNTER — OFFICE VISIT (OUTPATIENT)
Dept: FAMILY MEDICINE | Facility: OTHER | Age: 66
End: 2021-04-05
Attending: FAMILY MEDICINE
Payer: COMMERCIAL

## 2021-04-05 VITALS
OXYGEN SATURATION: 98 % | WEIGHT: 203.2 LBS | HEIGHT: 71 IN | DIASTOLIC BLOOD PRESSURE: 88 MMHG | HEART RATE: 108 BPM | RESPIRATION RATE: 16 BRPM | SYSTOLIC BLOOD PRESSURE: 134 MMHG | TEMPERATURE: 97.8 F | BODY MASS INDEX: 28.45 KG/M2

## 2021-04-05 DIAGNOSIS — Z12.11 SPECIAL SCREENING FOR MALIGNANT NEOPLASMS, COLON: ICD-10-CM

## 2021-04-05 DIAGNOSIS — Z00.00 ROUTINE HISTORY AND PHYSICAL EXAMINATION OF ADULT: Primary | ICD-10-CM

## 2021-04-05 DIAGNOSIS — Z72.0 TOBACCO ABUSE: ICD-10-CM

## 2021-04-05 DIAGNOSIS — Z12.5 SCREENING FOR PROSTATE CANCER: ICD-10-CM

## 2021-04-05 DIAGNOSIS — E78.00 PURE HYPERCHOLESTEROLEMIA: ICD-10-CM

## 2021-04-05 DIAGNOSIS — Z87.891 PERSONAL HISTORY OF TOBACCO USE: ICD-10-CM

## 2021-04-05 DIAGNOSIS — I10 ESSENTIAL HYPERTENSION: ICD-10-CM

## 2021-04-05 LAB
ALBUMIN SERPL-MCNC: 4.6 G/DL (ref 3.5–5.7)
ALP SERPL-CCNC: 51 U/L (ref 34–104)
ALT SERPL W P-5'-P-CCNC: 19 U/L (ref 7–52)
ANION GAP SERPL CALCULATED.3IONS-SCNC: 7 MMOL/L (ref 3–14)
AST SERPL W P-5'-P-CCNC: 18 U/L (ref 13–39)
BILIRUB SERPL-MCNC: 0.4 MG/DL (ref 0.3–1)
BUN SERPL-MCNC: 14 MG/DL (ref 7–25)
CALCIUM SERPL-MCNC: 9.6 MG/DL (ref 8.6–10.3)
CHLORIDE SERPL-SCNC: 101 MMOL/L (ref 98–107)
CHOLEST SERPL-MCNC: 166 MG/DL
CO2 SERPL-SCNC: 27 MMOL/L (ref 21–31)
CREAT SERPL-MCNC: 0.95 MG/DL (ref 0.7–1.3)
GFR SERPL CREATININE-BSD FRML MDRD: 80 ML/MIN/{1.73_M2}
GLUCOSE SERPL-MCNC: 113 MG/DL (ref 70–105)
HDLC SERPL-MCNC: 44 MG/DL (ref 23–92)
LDLC SERPL CALC-MCNC: 89 MG/DL
NONHDLC SERPL-MCNC: 122 MG/DL
POTASSIUM SERPL-SCNC: 4.2 MMOL/L (ref 3.5–5.1)
PROT SERPL-MCNC: 7.8 G/DL (ref 6.4–8.9)
PSA SERPL-ACNC: 2.96 NG/ML
SODIUM SERPL-SCNC: 135 MMOL/L (ref 134–144)
TRIGL SERPL-MCNC: 167 MG/DL

## 2021-04-05 PROCEDURE — G0296 VISIT TO DETERM LDCT ELIG: HCPCS | Performed by: FAMILY MEDICINE

## 2021-04-05 PROCEDURE — G0103 PSA SCREENING: HCPCS | Mod: ZL | Performed by: FAMILY MEDICINE

## 2021-04-05 PROCEDURE — 99396 PREV VISIT EST AGE 40-64: CPT | Performed by: FAMILY MEDICINE

## 2021-04-05 PROCEDURE — 80061 LIPID PANEL: CPT | Mod: ZL | Performed by: FAMILY MEDICINE

## 2021-04-05 PROCEDURE — 80053 COMPREHEN METABOLIC PANEL: CPT | Mod: ZL | Performed by: FAMILY MEDICINE

## 2021-04-05 PROCEDURE — 36415 COLL VENOUS BLD VENIPUNCTURE: CPT | Mod: ZL | Performed by: FAMILY MEDICINE

## 2021-04-05 RX ORDER — SIMVASTATIN 20 MG
20 TABLET ORAL AT BEDTIME
Qty: 90 TABLET | Refills: 3 | Status: SHIPPED | OUTPATIENT
Start: 2021-04-05 | End: 2022-05-19

## 2021-04-05 RX ORDER — LISINOPRIL 10 MG/1
10 TABLET ORAL DAILY
Qty: 90 TABLET | Refills: 3 | Status: SHIPPED | OUTPATIENT
Start: 2021-04-05 | End: 2022-05-19

## 2021-04-05 ASSESSMENT — PAIN SCALES - GENERAL: PAINLEVEL: NO PAIN (0)

## 2021-04-05 ASSESSMENT — PATIENT HEALTH QUESTIONNAIRE - PHQ9: SUM OF ALL RESPONSES TO PHQ QUESTIONS 1-9: 0

## 2021-04-05 ASSESSMENT — MIFFLIN-ST. JEOR: SCORE: 1725.9

## 2021-04-05 NOTE — PROGRESS NOTES
SUBJECTIVE:  Danny Lua is a 65 year old male here for annual exam.  Is a history of hypertension hyperlipidemia.  He does not check his blood pressure at home.  He reports he has been feeling well without any cardiovascular symptoms.  He admits that he does not get much regular exercise.  He continues to smoke a pack a day for the last 50 years.  He is hoping to retire this summer.  He otherwise has no new concerns today.    Patient Active Problem List    Diagnosis Date Noted     Hypertension 02/05/2018     Priority: Medium     Diverticulosis of sigmoid colon 04/20/2015     Priority: Medium     H/O adenomatous polyp of colon 04/16/2015     Priority: Medium     Family history of prostate cancer 02/18/2014     Priority: Medium     Hyperlipidemia 11/18/2013     Priority: Medium     Tobacco abuse 11/18/2013     Priority: Medium       No past medical history on file.    Past Surgical History:   Procedure Laterality Date     COLONOSCOPY      01/20/2011,few tics, large polyp in rectum (see path report).  Next due 2014.     COLONOSCOPY  04/2015    Tubular adenomas ,F/U 2020       Current Outpatient Medications   Medication Sig Dispense Refill     lisinopril (ZESTRIL) 10 MG tablet Take 1 tablet (10 mg) by mouth daily 90 tablet 3     simvastatin (ZOCOR) 20 MG tablet Take 1 tablet (20 mg) by mouth At Bedtime 90 tablet 3       Allergies:  No Known Allergies    Family History   Problem Relation Age of Onset     Cancer Mother         Cancer,liver     Prostate Cancer Father         Cancer-prostate,metastatic     Heart Disease Father         Heart Disease,family history on father side       Social History     Tobacco Use     Smoking status: Current Every Day Smoker     Packs/day: 1.00     Types: Cigarettes     Smokeless tobacco: Never Used     Tobacco comment: Patient and his son are going to try to quit smoking together   Substance Use Topics     Alcohol use: No     Drug use: Never     Comment: Drug use: No       ROS:    As  "above otherwise ROS is unremarkable.      OBJECTIVE:  BP (!) 138/96   Pulse 108   Temp 97.8  F (36.6  C)   Resp 16   Ht 1.791 m (5' 10.5\")   Wt 92.2 kg (203 lb 3.2 oz)   SpO2 98%   BMI 28.74 kg/m      EXAM:  General Appearance: Pleasant, alert, appropriate appearance for age. No acute distress  Head: Normal. Normocephalic, atraumatic.  Eyes: PERRL, EOMI  Ears: Normal TM's bilaterally. Normal auditory canals and external ears.   OroPharynx: Dental hygiene adequate. Normal buccal mucosa. Normal pharynx.  Neck: Supple, no masses or nodes, no lymphadenopathy.  No thyromegaly.  Lungs: Normal chest wall and respirations. Clear to auscultation, no wheezes or crackles.  Cardiovascular: Regular rate and rhythm. S1, S2, no murmurs.  Gastrointestinal: Soft, nontender, no abnormal masses or organomegaly. BS normal.  Musculoskeletal: No edema.  Skin: no concerning or new rashes.  Neurologic Exam: CN 2-12 grossly intact.  Normal gait.    Psychiatric Exam: Alert and oriented, appropriate affect.    ASSESSEMENT AND PLAN:    1. Routine history and physical examination of adult    2. Essential hypertension    3. Pure hypercholesterolemia    4. Screening for prostate cancer    5. Special screening for malignant neoplasms, colon    6. Tobacco abuse    7. Personal history of tobacco use      Refer him for repeat colonoscopy and lung cancer screening.    We will get fasting labs today.    He is scheduled to get Covid #2.    Pneumonia vaccines when he turns 66.    Discussed the importance of quitting smoking, he is not interested at this time.    He will start checking his blood pressure at home and if he notices that his blood pressure is elevated consistently we can consider increasing lisinopril to 20 mg daily.  Otherwise his medications refilled for another year.    Bismark Overton MD  Family Medicine      Lung Cancer Screening Shared Decision Making Visit     Danny Lua is eligible for lung cancer screening on the basis of " the information provided in my signed lung cancer screening order.     I have discussed with patient the risks and benefits of screening for lung cancer with low-dose CT.     The risks include:  radiation exposure: one low dose chest CT has as much ionizing radiation as about 15 chest x-rays or 6 months of background radiation living in [place]    false positives: 96% of positive findings/nodules are NOT cancer, but some might still require additional diagnostic evaluation, including biopsy  over-diagnosis: some slow growing cancers that might never have been clinically significant will be detected and treated unnecessarily     The benefit of early detection of lung cancer is contingent upon adherence to annual screening or more frequent follow up if indicated.     Furthermore, reaping the benefits of screening requires Danny Lua to be willing and physically able to undergo diagnostic procedures, if indicated. Although no specific guide is available for determining severity of comorbidities, it is reasonable to withhold screening in patients who have greater mortality risk from other diseases.     We did discuss that the only way to prevent lung cancer is to not smoke. Smoking cessation counseling was given, duration < 3 minutes.      I did not offer risk estimation using a calculator such as this one:    ShouldIScreen

## 2021-04-05 NOTE — PATIENT INSTRUCTIONS

## 2021-04-05 NOTE — NURSING NOTE
Patient presents today for annual physical.    Medication Reconciliation Complete    Mariola Ladd LPN  4/5/2021 9:43 AM

## 2021-04-05 NOTE — LETTER
April 5, 2021      Danny Lua  [address]    [facility] [id number]        Dear ,    We are writing to inform you of your test results.    Diabetes screening with fasting glucose came back elevated in the prediabetes range.  This increases your risk of diabetes in the future.  Improving your diet and increasing your daily exercise can help minimize this risk.    Your cholesterol panel has improved over the past year.  Keep up the good work.    Your liver, kidney and prostate testing all came back normal and stable.    All of your labs should be checked again in 1 year.    Resulted Orders   Lipid Panel   Result Value Ref Range    Cholesterol 166 <200 mg/dL    Triglycerides 167 (H) <150 mg/dL      Comment:      Borderline high:  150-199 mg/dl  High:             200-499 mg/dl  Very high:       >499 mg/dl      HDL Cholesterol 44 23 - 92 mg/dL    LDL Cholesterol Calculated 89 <100 mg/dL      Comment:      Desirable:       <100 mg/dl    Non HDL Cholesterol 122 <130 mg/dL   Comprehensive Metabolic Panel   Result Value Ref Range    Sodium 135 134 - 144 mmol/L    Potassium 4.2 3.5 - 5.1 mmol/L    Chloride 101 98 - 107 mmol/L    Carbon Dioxide 27 21 - 31 mmol/L    Anion Gap 7 3 - 14 mmol/L    Glucose 113 (H) 70 - 105 mg/dL    Urea Nitrogen 14 7 - 25 mg/dL    Creatinine 0.95 0.70 - 1.30 mg/dL    GFR Estimate 80 >60 mL/min/[1.73_m2]    GFR Estimate If Black >90 >60 mL/min/[1.73_m2]    Calcium 9.6 8.6 - 10.3 mg/dL    Bilirubin Total 0.4 0.3 - 1.0 mg/dL    Albumin 4.6 3.5 - 5.7 g/dL    Protein Total 7.8 6.4 - 8.9 g/dL    Alkaline Phosphatase 51 34 - 104 U/L    ALT 19 7 - 52 U/L    AST 18 13 - 39 U/L   PSA Screen GH   Result Value Ref Range    PSA Screen 2.961 <3.100 ng/mL      Comment:      The [employer] Access PSAS WHO assay is a two site immunoenzymatic assay. Assay   values obtained with different assay methods cannot be used interchangeably   due to differences in assay methods and reagent  specificity.         If you have any questions or concerns, please call the clinic at the number listed above.       Sincerely,      Benny Overton

## 2021-04-07 DIAGNOSIS — Z86.0101 H/O ADENOMATOUS POLYP OF COLON: Primary | ICD-10-CM

## 2021-04-07 RX ORDER — BISACODYL 5 MG/1
TABLET, DELAYED RELEASE ORAL
Qty: 2 TABLET | Refills: 0 | Status: ON HOLD | OUTPATIENT
Start: 2021-04-07 | End: 2021-06-07

## 2021-04-07 RX ORDER — POLYETHYLENE GLYCOL 3350, SODIUM CHLORIDE, SODIUM BICARBONATE, POTASSIUM CHLORIDE 420; 11.2; 5.72; 1.48 G/4L; G/4L; G/4L; G/4L
4000 POWDER, FOR SOLUTION ORAL ONCE
Qty: 4000 ML | Refills: 0 | Status: SHIPPED | OUTPATIENT
Start: 2021-04-07 | End: 2021-04-07

## 2021-04-07 NOTE — TELEPHONE ENCOUNTER
Screening Questions for the Scheduling of Screening Colonoscopies   (If Colonoscopy is diagnostic, Provider should review the chart before scheduling.)  Are you younger than 50 or older than 80?   NO   Do you take aspirin or fish oil?  NO  (if yes, tell patient to stop 1 week prior to Colonoscopy)  Do you take warfarin (Coumadin), clopidogrel (Plavix), apixaban (Eliquis), dabigatram (Pradaxa), rivaroxaban (Xarelto) or any blood thinner? NO   Do you use oxygen at home?  NO   Do you have kidney disease? NO   Are you on dialysis? NO   Have you had a stroke or heart attack in the last year? NO   Have you had a stent in your heart or any blood vessel in the last year? NO   Have you had a transplant of any organ? NO   Have you had a colonoscopy or upper endoscopy (EGD) before? YES          When?  2015  Date of scheduled Colonoscopy. 06/07/2021  Provider RAVEN   Pharmacy [facility]

## 2021-06-03 ENCOUNTER — ALLIED HEALTH/NURSE VISIT (OUTPATIENT)
Dept: FAMILY MEDICINE | Facility: OTHER | Age: 66
End: 2021-06-03
Attending: SURGERY
Payer: COMMERCIAL

## 2021-06-03 DIAGNOSIS — Z86.0101 H/O ADENOMATOUS POLYP OF COLON: ICD-10-CM

## 2021-06-03 LAB
SARS-COV-2 RNA RESP QL NAA+PROBE: NORMAL
SPECIMEN SOURCE: NORMAL

## 2021-06-03 PROCEDURE — C9803 HOPD COVID-19 SPEC COLLECT: HCPCS

## 2021-06-03 PROCEDURE — U0003 INFECTIOUS AGENT DETECTION BY NUCLEIC ACID (DNA OR RNA); SEVERE ACUTE RESPIRATORY SYNDROME CORONAVIRUS 2 (SARS-COV-2) (CORONAVIRUS DISEASE [COVID-19]), AMPLIFIED PROBE TECHNIQUE, MAKING USE OF HIGH THROUGHPUT TECHNOLOGIES AS DESCRIBED BY CMS-2020-01-R: HCPCS | Mod: ZL | Performed by: SURGERY

## 2021-06-03 PROCEDURE — U0005 INFEC AGEN DETEC AMPLI PROBE: HCPCS | Mod: ZL | Performed by: SURGERY

## 2021-06-04 LAB
LABORATORY COMMENT REPORT: NORMAL
SARS-COV-2 RNA RESP QL NAA+PROBE: NEGATIVE
SPECIMEN SOURCE: NORMAL

## 2021-06-06 ENCOUNTER — ANESTHESIA EVENT (OUTPATIENT)
Dept: SURGERY | Facility: OTHER | Age: 66
End: 2021-06-06
Payer: COMMERCIAL

## 2021-06-06 ENCOUNTER — NURSE TRIAGE (OUTPATIENT)
Dept: NURSING | Facility: CLINIC | Age: 66
End: 2021-06-06

## 2021-06-06 ASSESSMENT — LIFESTYLE VARIABLES: TOBACCO_USE: 1

## 2021-06-07 ENCOUNTER — HOSPITAL ENCOUNTER (OUTPATIENT)
Facility: OTHER | Age: 66
Discharge: HOME | End: 2021-06-07
Attending: SURGERY | Admitting: SURGERY
Payer: COMMERCIAL

## 2021-06-07 ENCOUNTER — ANESTHESIA (OUTPATIENT)
Dept: SURGERY | Facility: OTHER | Age: 66
End: 2021-06-07
Payer: COMMERCIAL

## 2021-06-07 VITALS
HEART RATE: 82 BPM | RESPIRATION RATE: 12 BRPM | TEMPERATURE: 97.1 F | BODY MASS INDEX: 29.06 KG/M2 | DIASTOLIC BLOOD PRESSURE: 80 MMHG | HEIGHT: 70 IN | WEIGHT: 203 LBS | SYSTOLIC BLOOD PRESSURE: 125 MMHG | OXYGEN SATURATION: 100 %

## 2021-06-07 PROBLEM — K63.5 COLON POLYPS: Status: ACTIVE | Noted: 2021-06-07

## 2021-06-07 PROCEDURE — 258N000003 HC RX IP 258 OP 636: Performed by: SURGERY

## 2021-06-07 PROCEDURE — 88305 TISSUE EXAM BY PATHOLOGIST: CPT

## 2021-06-07 PROCEDURE — 250N000011 HC RX IP 250 OP 636: Performed by: NURSE ANESTHETIST, CERTIFIED REGISTERED

## 2021-06-07 PROCEDURE — 250N000013 HC RX MED GY IP 250 OP 250 PS 637: Performed by: SURGERY

## 2021-06-07 PROCEDURE — 45380 COLONOSCOPY AND BIOPSY: CPT | Performed by: SURGERY

## 2021-06-07 PROCEDURE — 45384 COLONOSCOPY W/LESION REMOVAL: CPT | Mod: PT | Performed by: SURGERY

## 2021-06-07 PROCEDURE — 45384 COLONOSCOPY W/LESION REMOVAL: CPT | Performed by: NURSE ANESTHETIST, CERTIFIED REGISTERED

## 2021-06-07 PROCEDURE — 250N000009 HC RX 250: Performed by: NURSE ANESTHETIST, CERTIFIED REGISTERED

## 2021-06-07 PROCEDURE — 250N000009 HC RX 250: Performed by: SURGERY

## 2021-06-07 RX ORDER — LIDOCAINE HYDROCHLORIDE 20 MG/ML
INJECTION, SOLUTION INFILTRATION; PERINEURAL PRN
Status: DISCONTINUED | OUTPATIENT
Start: 2021-06-07 | End: 2021-06-07

## 2021-06-07 RX ORDER — LIDOCAINE 40 MG/G
CREAM TOPICAL
Status: DISCONTINUED | OUTPATIENT
Start: 2021-06-07 | End: 2021-06-07 | Stop reason: HOSPADM

## 2021-06-07 RX ORDER — NALOXONE HYDROCHLORIDE 0.4 MG/ML
0.4 INJECTION, SOLUTION INTRAMUSCULAR; INTRAVENOUS; SUBCUTANEOUS
Status: CANCELLED | OUTPATIENT
Start: 2021-06-07 | End: 2021-06-08

## 2021-06-07 RX ORDER — NALOXONE HYDROCHLORIDE 0.4 MG/ML
0.2 INJECTION, SOLUTION INTRAMUSCULAR; INTRAVENOUS; SUBCUTANEOUS
Status: CANCELLED | OUTPATIENT
Start: 2021-06-07 | End: 2021-06-08

## 2021-06-07 RX ORDER — FLUMAZENIL 0.1 MG/ML
0.2 INJECTION, SOLUTION INTRAVENOUS
Status: CANCELLED | OUTPATIENT
Start: 2021-06-07 | End: 2021-06-07

## 2021-06-07 RX ORDER — SIMETHICONE 40MG/0.6ML
SUSPENSION, DROPS(FINAL DOSAGE FORM)(ML) ORAL PRN
Status: DISCONTINUED | OUTPATIENT
Start: 2021-06-07 | End: 2021-06-07 | Stop reason: HOSPADM

## 2021-06-07 RX ORDER — SODIUM CHLORIDE, SODIUM LACTATE, POTASSIUM CHLORIDE, CALCIUM CHLORIDE 600; 310; 30; 20 MG/100ML; MG/100ML; MG/100ML; MG/100ML
INJECTION, SOLUTION INTRAVENOUS CONTINUOUS
Status: CANCELLED | OUTPATIENT
Start: 2021-06-07

## 2021-06-07 RX ORDER — ONDANSETRON 2 MG/ML
4 INJECTION INTRAMUSCULAR; INTRAVENOUS
Status: DISCONTINUED | OUTPATIENT
Start: 2021-06-07 | End: 2021-06-07 | Stop reason: HOSPADM

## 2021-06-07 RX ORDER — PROPOFOL 10 MG/ML
INJECTION, EMULSION INTRAVENOUS PRN
Status: DISCONTINUED | OUTPATIENT
Start: 2021-06-07 | End: 2021-06-07

## 2021-06-07 RX ORDER — SODIUM CHLORIDE, SODIUM LACTATE, POTASSIUM CHLORIDE, CALCIUM CHLORIDE 600; 310; 30; 20 MG/100ML; MG/100ML; MG/100ML; MG/100ML
INJECTION, SOLUTION INTRAVENOUS CONTINUOUS
Status: DISCONTINUED | OUTPATIENT
Start: 2021-06-07 | End: 2021-06-07 | Stop reason: HOSPADM

## 2021-06-07 RX ORDER — PROPOFOL 10 MG/ML
INJECTION, EMULSION INTRAVENOUS CONTINUOUS PRN
Status: DISCONTINUED | OUTPATIENT
Start: 2021-06-07 | End: 2021-06-07

## 2021-06-07 RX ADMIN — SODIUM CHLORIDE, POTASSIUM CHLORIDE, SODIUM LACTATE AND CALCIUM CHLORIDE 30 ML/HR: 600; 310; 30; 20 INJECTION, SOLUTION INTRAVENOUS at 07:04

## 2021-06-07 RX ADMIN — SODIUM CHLORIDE, POTASSIUM CHLORIDE, SODIUM LACTATE AND CALCIUM CHLORIDE: 600; 310; 30; 20 INJECTION, SOLUTION INTRAVENOUS at 07:27

## 2021-06-07 RX ADMIN — LIDOCAINE HYDROCHLORIDE 100 MG: 20 INJECTION, SOLUTION INFILTRATION; PERINEURAL at 07:30

## 2021-06-07 RX ADMIN — PROPOFOL 200 MCG/KG/MIN: 10 INJECTION, EMULSION INTRAVENOUS at 07:30

## 2021-06-07 RX ADMIN — PROPOFOL 100 MG: 10 INJECTION, EMULSION INTRAVENOUS at 07:30

## 2021-06-07 ASSESSMENT — MIFFLIN-ST. JEOR: SCORE: 1717.05

## 2021-06-07 NOTE — TELEPHONE ENCOUNTER
Taking prep for colonoscopy in the morning, started 90 minutes ago and he hasn't had any stool yet.  Advised patient it can take a little time to get started, but he should be getting results soon, and to saty by the toilet.  He will call back for any further questions/concerns.    Lisandra Yarbrough RN  [facility]    Additional Information    Health Information question, no triage required and triager able to answer question    Protocols used: INFORMATION ONLY CALL-A-AH

## 2021-06-07 NOTE — OP NOTE
PROCEDURE NOTE    DATE OF SERVICE: 6/7/2021    SURGEON: Bib Leon MD    PRE-OP DIAGNOSIS:      History of Polyps      POST-OP DIAGNOSIS:  Same  Sigmoid Diverticulosis  Polyps at SF, 35 cm, 20 cm, and 10 cm    PROCEDURE:   Colonoscopy with hot biopsy      ANESTHESIA:  JAQUELINE Zhao CRNA    INDICATION FOR THE PROCEDURE: The patient is a 65 year old male with h/o polyps . The patient has no other complaints  . After explaining the risks to include bleeding, perforation, potential inability toreach the cecum, the patient wished to proceed.    PROCEDURE:After adequate sedation, the patient was in the left lateral decubitus position.  Rectal exam was performed.  There was normal tone and no palpable masses .  The colonoscope was introduced into the rectum and advanced to the cecum with Moderate difficulty.  The patient's prep was good.  The terminal cecum was reached.  The cecum, ascending, transverse, descending and sigmoid colon was extensive sigmoid diverticulosis. Diminutive to small ( 0.3 cm ) flat polyps at SF, 35 cm , 20 cm and 10 cm were hot biopsied and destroyed .  The scope was retroflexed in the rectum.  The rectum was unremarkable  .  The scope was straightened and removed.  The patient tolerated the procedure well.     ESTIMATED BLOOD LOSS: none    COMPLICATIONS:  None    TISSUE REMOVED:  Yes    RECOMMEND:        Follow-up pending pathology  Fiber  Given literature on diverticulosis    Bib Leon MD FACS

## 2021-06-07 NOTE — OR NURSING
patient has been discharged to home at 0854 via ambulatory accompanied by friend/sister    Written discharge instructions were provided to patient.  Prescriptions were none.      Patient and adult caring for them verbalize understanding of discharge instructions including no driving until tomorrow and no longer taking narcotic pain medications - no operating mechanical equipment and no making any important decisions.They understand reason for discharge, and necessary follow-up appointments.

## 2021-06-07 NOTE — ANESTHESIA CARE TRANSFER NOTE
Patient: Danny Lua    Procedure(s):  COLONOSCOPY, WITH POLYPECTOMY AND BIOPSY    Diagnosis: Encounter for screening colonoscopy [Z12.11]  Diagnosis Additional Information: No value filed.    Anesthesia Type:   General     Note:    Oropharynx: oropharynx clear of all foreign objects  Level of Consciousness: drowsy  Oxygen Supplementation: nasal cannula  Level of Supplemental Oxygen (L/min / FiO2): 2  Independent Airway: airway patency satisfactory and stable  Dentition: dentition unchanged  Vital Signs Stable: post-procedure vital signs reviewed and stable  Report to RN Given: handoff report given  Patient transferred to: Phase II    Handoff Report: Identifed the Patient, Identified the Reponsible Provider, Reviewed the pertinent medical history, Discussed the surgical course, Reviewed Intra-OP anesthesia mangement and issues during anesthesia, Set expectations for post-procedure period and Allowed opportunity for questions and acknowledgement of understanding      Vitals: (Last set prior to Anesthesia Care Transfer)  CRNA VITALS  6/7/2021 0737 - 6/7/2021 0807      6/7/2021             Pulse:  96    Ht Rate:  96    SpO2:  98 %    Resp Rate (set):  10        Electronically Signed By: David Kellerman, APRN CRNA  June 7, 2021  8:07 AM

## 2021-06-07 NOTE — H&P
"History and Physical    CHIEF COMPLAINT / REASON FOR PROCEDURE:  H/o polyps    PERTINENT HISTORY   Patient is a 65 year old male who presents today for colonoscopy for h/o polyps.   Last colonoscopy 2015.  Patient has no complaints.    History reviewed. No pertinent past medical history.  Past Surgical History:   Procedure Laterality Date     COLONOSCOPY      01/20/2011,few tics, large polyp in rectum (see path report).  Next due 2014.     COLONOSCOPY  04/2015    Tubular adenomas ,F/U 2020       Bleeding tendencies:  No    ALLERGIES/SENSITIVITIES: No Known Allergies     CURRENT MEDICATIONS:    Prior to Admission medications    Medication Sig Start Date End Date Taking? Authorizing Provider   lisinopril (ZESTRIL) 10 MG tablet Take 1 tablet (10 mg) by mouth daily 4/5/21  Yes Benny Overton   simvastatin (ZOCOR) 20 MG tablet Take 1 tablet (20 mg) by mouth At Bedtime 4/5/21  Yes Benny Overton       Physical Exam:  /85   Temp 97.8  F (36.6  C) (Tympanic)   Resp 12   Ht 1.778 m (5' 10\")   Wt 92.1 kg (203 lb)   SpO2 96%   BMI 29.13 kg/m    EXAM:  Chest/Respiratory Exam: Normal - Clear to auscultation without rales, rhonchi, or wheezing.  Cardiovascular Exam: normal, regular rate and rhythm        PLAN: COLONOSCOPY .  Patient understands risks of bleeding, perforation, potential inability to reach cecum, aspiration and wishes to proceed.    "

## 2021-06-07 NOTE — ANESTHESIA POSTPROCEDURE EVALUATION
Patient: Danny Lua    Procedure(s):  COLONOSCOPY, WITH POLYPECTOMY AND BIOPSY    Diagnosis:Encounter for screening colonoscopy [Z12.11]  Diagnosis Additional Information: No value filed.    Anesthesia Type:  General    Note:  Disposition: Outpatient   Postop Pain Control: Uneventful            Sign Out: Well controlled pain   PONV: No   Neuro/Psych: Uneventful            Sign Out: Acceptable/Baseline neuro status   Airway/Respiratory: Uneventful            Sign Out: Acceptable/Baseline resp. status   CV/Hemodynamics: Uneventful            Sign Out: Acceptable CV status; No obvious hypovolemia; No obvious fluid overload   Other NRE: NONE   DID A NON-ROUTINE EVENT OCCUR? No           Last vitals:  Vitals:    06/07/21 0656 06/07/21 0814   BP: 130/85 99/62   Pulse:  92   Resp: 12    Temp: 97.8  F (36.6  C) 97.1  F (36.2  C)   SpO2: 96% 97%       Last vitals prior to Anesthesia Care Transfer:  CR[facility] VITALS  6/7/2021 0737 - 6/7/2021 0824      6/7/2021             Pulse:  96    Ht Rate:  96    SpO2:  98 %    Resp Rate (set):  10          Electronically Signed By: David Kellerman, APRN CRNA  June 7, 2021  8:24 AM

## 2021-06-09 PROBLEM — K63.5 COLON POLYPS: Status: RESOLVED | Noted: 2021-06-07 | Resolved: 2021-06-09

## 2022-05-16 DIAGNOSIS — I10 ESSENTIAL HYPERTENSION: ICD-10-CM

## 2022-05-16 DIAGNOSIS — E78.00 PURE HYPERCHOLESTEROLEMIA: ICD-10-CM

## 2022-05-18 NOTE — TELEPHONE ENCOUNTER
"Encounter also routed to  OUTREACH to schedule an annual visit.   Josie Rivera RN on 5/18/2022 at 12:18 PM    lisinopril (ZESTRIL) 10 MG  Last Prescription Date: 4/5/21  Last Qty/Refills: 90 / 3    simvastatin (ZOCOR)   Last Prescription Date: 4/5/21  Last Qty/Refills: 90 / 3    Last Office Visit: 4/5/21 [facility]  Future Office Visit: none     Requested Prescriptions   Pending Prescriptions Disp Refills     lisinopril (ZESTRIL) 10 MG tablet [Pharmacy Med Name: Lisinopril 10 MG Oral Tablet] 90 tablet 0     Sig: Take 1 tablet by mouth once daily       ACE Inhibitors (Including Combos) Protocol Failed - 5/16/2022 11:15 AM        Failed - Normal serum creatinine on file in past 12 months     Recent Labs   Lab Test 04/05/21  1013   CR 0.95       Ok to refill medication if creatinine is low          Failed - Normal serum potassium on file in past 12 months     Recent Labs   Lab Test 04/05/21  1013   POTASSIUM 4.2             Passed - Blood pressure under 140/90 in past 12 months     BP Readings from Last 3 Encounters:   06/07/21 125/80   04/05/21 134/88   04/29/19 128/88                 Passed - Recent (12 mo) or future (30 days) visit within the authorizing provider's specialty     Patient has had an office visit with the authorizing provider or a provider within the authorizing providers department within the previous 12 mos or has a future within next 30 days. See \"Patient Info\" tab in inbasket, or \"Choose Columns\" in Meds & Orders section of the refill encounter.              Passed - Medication is active on med list        Passed - Patient is age 18 or older           simvastatin (ZOCOR) 20 MG tablet [Pharmacy Med Name: Simvastatin 20 MG Oral Tablet] 90 tablet 0     Sig: TAKE 1 TABLET BY MOUTH AT BEDTIME       Statins Protocol Failed - 5/16/2022 11:15 AM        Failed - LDL on file in past 12 months     Recent Labs   Lab Test 04/05/21  1013   LDL 89             Passed - No abnormal creatine kinase in past 12 months " "    No lab results found.             Passed - Recent (12 mo) or future (30 days) visit within the authorizing provider's specialty     Patient has had an office visit with the authorizing provider or a provider within the authorizing providers department within the previous 12 mos or has a future within next 30 days. See \"Patient Info\" tab in inbasket, or \"Choose Columns\" in Meds & Orders section of the refill encounter.              Passed - Medication is active on med list        Passed - Patient is age 18 or older             "

## 2022-05-19 RX ORDER — SIMVASTATIN 20 MG
TABLET ORAL
Qty: 90 TABLET | Refills: 0 | Status: SHIPPED | OUTPATIENT
Start: 2022-05-19

## 2022-05-19 RX ORDER — LISINOPRIL 10 MG/1
TABLET ORAL
Qty: 90 TABLET | Refills: 0 | Status: SHIPPED | OUTPATIENT
Start: 2022-05-19

## 2022-05-20 NOTE — TELEPHONE ENCOUNTER
Contacted patient, states he is no longer in the area and has since established else where.     Ruth Alcala on 5/20/2022 at 3:07 PM

## (undated) DEVICE — ENDO KIT COMPLIANCE DYKENDOCMPLY

## (undated) DEVICE — SOL WATER 1500ML

## (undated) DEVICE — SUCTION MANIFOLD NEPTUNE 2 SYS 4 PORT 0702-020-000

## (undated) DEVICE — ENDO BRUSH CHANNEL MASTER CLEANING 2-4.2MM BW-412T

## (undated) DEVICE — TUBING SUCTION 10'X3/16" N510

## (undated) DEVICE — ESU GROUND PAD ADULT W/CORD E7507

## (undated) DEVICE — ESU ENDO FORCEP BX HOT FD-210U

## (undated) RX ORDER — PROPOFOL 10 MG/ML
INJECTION, EMULSION INTRAVENOUS
Status: DISPENSED
Start: 2021-06-07

## (undated) RX ORDER — LIDOCAINE HYDROCHLORIDE 20 MG/ML
INJECTION, SOLUTION EPIDURAL; INFILTRATION; INTRACAUDAL; PERINEURAL
Status: DISPENSED
Start: 2021-06-07